# Patient Record
Sex: FEMALE | Race: WHITE | NOT HISPANIC OR LATINO | Employment: FULL TIME | URBAN - METROPOLITAN AREA
[De-identification: names, ages, dates, MRNs, and addresses within clinical notes are randomized per-mention and may not be internally consistent; named-entity substitution may affect disease eponyms.]

---

## 2024-11-10 ENCOUNTER — HOSPITAL ENCOUNTER (OUTPATIENT)
Dept: RADIOLOGY | Facility: MEDICAL CENTER | Age: 49
End: 2024-11-10
Attending: PHYSICIAN ASSISTANT
Payer: COMMERCIAL

## 2024-11-10 ENCOUNTER — OCCUPATIONAL MEDICINE (OUTPATIENT)
Dept: URGENT CARE | Facility: PHYSICIAN GROUP | Age: 49
End: 2024-11-10
Payer: COMMERCIAL

## 2024-11-10 VITALS
BODY MASS INDEX: 36.05 KG/M2 | SYSTOLIC BLOOD PRESSURE: 126 MMHG | HEIGHT: 62 IN | OXYGEN SATURATION: 99 % | RESPIRATION RATE: 16 BRPM | WEIGHT: 195.9 LBS | DIASTOLIC BLOOD PRESSURE: 70 MMHG | HEART RATE: 99 BPM | TEMPERATURE: 98.9 F

## 2024-11-10 DIAGNOSIS — M53.3 SACROILIAC JOINT DYSFUNCTION OF LEFT SIDE: ICD-10-CM

## 2024-11-10 DIAGNOSIS — S73.102A HIP SPRAIN, LEFT, INITIAL ENCOUNTER: ICD-10-CM
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
## 2024-11-10 DIAGNOSIS — S39.012A STRAIN OF LUMBAR REGION, INITIAL ENCOUNTER: ICD-10-CM

## 2024-11-10 PROCEDURE — 3078F DIAST BP <80 MM HG: CPT | Performed by: PHYSICIAN ASSISTANT

## 2024-11-10 PROCEDURE — 99204 OFFICE O/P NEW MOD 45 MIN: CPT | Performed by: PHYSICIAN ASSISTANT

## 2024-11-10 PROCEDURE — 3074F SYST BP LT 130 MM HG: CPT | Performed by: PHYSICIAN ASSISTANT

## 2024-11-10 PROCEDURE — 72100 X-RAY EXAM L-S SPINE 2/3 VWS: CPT

## 2024-11-10 PROCEDURE — 73501 X-RAY EXAM HIP UNI 1 VIEW: CPT | Mod: LT

## 2024-11-10 RX ORDER — METHYLPREDNISOLONE 4 MG/1
4 TABLET ORAL DAILY
Qty: 21 TABLET | Refills: 0 | Status: SHIPPED | OUTPATIENT
Start: 2024-11-10 | End: 2024-11-20

## 2024-11-10 NOTE — PROGRESS NOTES
"Subjective:     Viviana Capps is a 49 y.o. female who presents for Other (New worker comps. Happened Friday, putting boxes away at work, now back whole back is in pain. )         DOI: 11/8/2024    SARBJIT: Injured left hip and left low back while moving filing boxes into storage unit    Initial visit:  Presents today for the above-stated injury on the above-stated date.  She describes the pain as being present over the left lumbosacral region, left buttock and extending into the left hip.  Does have occasional left anterior hip crease pain.  She does have an ability to put on pants, shoes and socks due to the discomfort.  No numbness/tingling into the groin, no loss of bowel or bladder function.  No secondary occupation, no predisposing injuries.  Used over-the-counter medications for pain.    PMH:   No pertinent past medical history to this problem  MEDS:  Medications were reviewed in EMR  ALLERGIES:  Allergies were reviewed in EMR  SOCHX:  Works as a subsidy   FH:   No pertinent family history to this problem       Objective:     /70 (BP Location: Right arm, Patient Position: Sitting, BP Cuff Size: Adult)   Pulse 99   Temp 37.2 °C (98.9 °F)   Resp 16   Ht 1.575 m (5' 2\")   Wt 88.9 kg (195 lb 14.4 oz)   SpO2 99%   BMI 35.83 kg/m²     Examination of the lumbosacral region does reveal tenderness palpation over the left SI joint, left lumbar paraspinal musculature and left gluteal muscles.  Patient in a fixed upright trunk position due to pain    Examination of the left hip does reveal pain with active hip flexion, normal range of motion of both hip internal and external rotation but does have pain with internal rotation.      Diagnostic:    Lumbar x-ray series  Radiologist IMPRESSION:  Facet arthropathy without malalignment or fracture    Left hip x-ray series  Radiologist IMPRESSION:  1.  Normal frontal view of the pelvis and left hip series  2.  Facet arthropathy of the lumbar " spine    Assessment/Plan:     Encounter Diagnoses   Name Primary?    Strain of lumbar region, initial encounter     Sacroiliac joint dysfunction of left side     Hip sprain, left, initial encounter     Work related injury          Plan:  X-ray of the lumbar spine and left hip, lumbar x-ray revealed multilevel facet joint arthropathy.  Trial Medrol Dosepak, Zanaflex, use over-the-counter anti-inflammatory medications.  Avoid muscle relaxer use prior to or during work or while operating motor vehicle.  Work status updated.  Return on 11/15/2024    Differential diagnosis, natural history, supportive care, and indications for immediate follow-up discussed.    Bogdan Lujan PA-C

## 2024-11-10 NOTE — LETTER
"    EMPLOYEE’S CLAIM FOR COMPENSATION/ REPORT OF INITIAL TREATMENT  FORM C-4  PLEASE TYPE OR PRINT    EMPLOYEE’S CLAIM - PROVIDE ALL INFORMATION REQUESTED   First Name                    JUAN JOSE Michelle                  Last Name  Génesis Birthdate                    1975                Sex  Female Claim Number (Insurer’s Use Only)     Home Address  [address] Age  49 y.o. Height  1.575 m (5' 2\") Weight  88.9 kg (195 lb 14.4 oz) Social Security Number     [place]  [address] Telephone  [phone] (home)    Mailing Address  [address] [place]  [address] Primary Language Spoken  English    INSURER   THIRD-PARTY   Misc Workers Comp   Employee's Occupation (Job Title) When Injury or Occupational Disease Occurred  [employer]     Employer's Name/Company Name     Telephone      Office Mail Address (Number and Street)       Date of Injury (if applicable) 11/8/2024               Hours Injury (if applicable)  10:00 AM Date Employer Notified  11/8/2024 Last Day of Work after Injury or Occupational Disease  11/8/2024 Supervisor to Whom Injury Reported  Briana Warner   Address or Location of Accident (if applicable)  Work [1]   What were you doing at the time of accident? (if applicable)  Moving boxes from truck into the storage unit    How did this injury or occupational disease occur? (Be specific and answer in detail. Use additional sheet if necessary)  was moving boxes of files into the storage unit. When trying to put the box on the shelf I turned wrong and felt pain in my left hip   If you believe that you have an occupational disease, when did you first have knowledge of the disability and its relationship to your employment?  N/A Witnesses to the Accident (if applicable)  co-workers Roz Alvarez and Tisha Rose      Nature of " Injury or Occupational Disease  Strain  Part(s) of Body Injured or Affected  Hip (L) Upper Leg (L) Lower Back Area (Lumbar Area & Lumbo-Sacral)    I CERTIFY THAT THE ABOVE IS TRUE AND CORRECT TO T HE BEST OF MY KNOWLEDGE AND THAT I HAVE PROVIDED THIS INFORMATION IN ORDER TO OBTAIN THE BENEFITS OF [place]CC[place], OR CHAPTER [place]).  I HEREBY AUTHORIZE ANY PHYSICIAN, CHIROPRACTOR, SURGEON, PRACTITIONER OR ANY OTHER PERSON, ANY HOSPITAL, INCLUDING [facility] OR [facility], ANY  MEDICAL SERVICE ORGANIZATION, ANY INSURANCE COMPANY, OR OTHER INSTITUTION OR ORGANIZATION TO RELEASE TO EACH OTHER, ANY MEDICAL OR OTHER INFORMATION, INCLUDING BENEFITS PAID OR PAYABLE, PERTINENT TO THIS INJURY OR DISEASE, EXCEPT INFORMATION RELATIVE TO DIAGNOSIS, TREATMENT AND/OR COUNSELING FOR AIDS, PSYCHOLOGICAL CONDITIONS, ALCOHOL OR CONTROLLED SUBSTANCES, FOR WHICH I MUST GIVE SPECIFIC AUTHORIZATION.  A PHOTOSTAT OF THIS AUTHORIZATION SHALL BE VALID AS THE ORIGINAL.     Date   Place Employee’s Original or  *Electronic Signature   THIS REPORT MUST BE COMPLETED AND MAILED WITHIN 3 WORKING DAYS OF TREATMENT   Place  [facility]    Name of Facility  [place]   Date 11/10/2024 Diagnosis and Description of Injury or Occupational Disease  (S39.012A) Strain of lumbar region, initial encounter  (M53.3) Sacroiliac joint dysfunction of left side  (S73.102A) Hip sprain, left, initial encounter  (Y99.0) Work related injury  Diagnoses of Strain of lumbar region, initial encounter, Sacroiliac joint dysfunction of left side, Hip sprain, left, initial encounter, and Work related injury were pertinent to this visit. Is there evidence that the injured employee was under the influence of alcohol and/or another controlled substance at the time of accident?  []No  [] Yes (if yes, please explain)   Hour 2:06 PM  No   Treatment: X-ray of the lumbar  spine and left hip, lumbar x-ray revealed multilevel facet joint arthropathy.  Trial Medrol Dosepak, Zanaflex, use over-the-counter anti-inflammatory medications.  Avoid muscle relaxer use prior to or during work or while operating motor vehicle.  Work status updated.  Return on 11/15/2024      Have you advised the patient to remain off work five days or more?   [] Yes Indicate dates: From   To    [] No      If no, is the injured employee capable of: [] full duty [] modified duty                     If modified duty, specify any limitations / restrictions:  See D39                                                                                                                                                                                                                                                                                                                                                                                                               X-Ray Findings: Negative    From information given by the employee, together with medical evidence, can you directly connect this injury or occupational disease as job incurred?  []Yes   [] No Yes    Is additional medical care by a physician indicated? []Yes [] No  Yes    Do you know of any previous injury or disease contributing to this condition or occupational disease? []Yes [] No (Explain if yes)                          No   Date  11/10/2024 Print Health Care Provider’s Name  Edmundo Lujan P.A.-C. I certify that the employer’s copy of  this form was delivered to the employer on:   Address  [address] INSURER'S USE ONLY                       [place]  [address] Provider’s Tax ID Number  [id number]   Telephone  Dept: [phone]    Health Care Provider’s Original or Electronic Signature  e-EDMUNDO Prieto P.A.-C. Degree (MD,DO, DC,PALuizC,APRN)  PRITESH  Choose (if applicable)      ORIGINAL - TREATING HEALTHCARE PROVIDER PAGE 2 - INSURER/TPA  PAGE 3 - EMPLOYER PAGE 4 - EMPLOYEE             Form C-4 (rev.08/23)

## 2024-11-10 NOTE — LETTER
PHYSICIAN’S AND CHIROPRACTIC PHYSICIAN'S   PROGRESS REPORT   CERTIFICATION OF DISABILITY Claim Number:     Social Security Number:    Patient’s Name: Viviana Capps Date of Injury: 11/8/2024   Employer:  [employer] Name of MCO (if applicable):      Patient’s Job Description/Occupation: [employer]       Previous Injuries/Diseases/Surgeries Contributing to the Condition:         Diagnosis: (S39.012A) Strain of lumbar region, initial encounter  (M53.3) Sacroiliac joint dysfunction of left side  (S73.102A) Hip sprain, left, initial encounter  (Y99.0) Work related injury      Related to the Industrial Injury? Yes     Explain: DOI: 11/8/2024    SARBJIT: Injured left hip and left low back while moving filing boxes into storage unit    Initial visit:  Presents today for the above-stated injury on the above-stated date.  She describes the pain as being present over the left lumbosacral region, left buttock and extending into the left hip.  Does have occasional left anterior hip crease pain.  She does have an ability to put on pants, shoes and socks due to the discomfort.  No numbness/tingling into the groin, no loss of bowel or bladder function.  No secondary occupation, no predisposing injuries.  Used over-the-counter medications for pain.      Objective Medical Findings: Examination of the lumbosacral region does reveal tenderness palpation over the left SI joint, left lumbar paraspinal musculature and left gluteal muscles.  Patient in a fixed upright trunk position due to pain    Examination of the left hip does reveal pain with active hip flexion, normal range of motion of both hip internal and external rotation but does have pain with internal rotation.         None - Discharged                         Stable  No                 Ratable  No        Generally Improved                         Condition Worsened                  Condition Same  May Have Suffered a Permanent  Disability No     Treatment Plan:    X-ray of the lumbar spine and left hip, lumbar x-ray revealed multilevel facet joint arthropathy.  Trial Medrol Dosepak, Zanaflex, use over-the-counter anti-inflammatory medications.  Avoid muscle relaxer use prior to or during work or while operating motor vehicle.  Work status updated.  Return on 11/15/2024         No Change in Therapy                  PT/OT Prescribed                      Medication May be Used While Working        Case Management                          PT/OT Discontinued    Consultation    Further Diagnostic Studies:    Prescription(s)           Medrol Dosepak, Zanaflex     Released to FULL DUTY /No Restrictions on (Date):       Certified TOTALLY TEMPORARILY DISABLED (Indicate Dates) From:   To:    X  Released to RESTRICTED/Modified Duty on (Date): From: 11/10/2024 To: 11/15/2024  Restrictions Are:  Temporary      No Sitting    No Standing    No Pulling Other: Sedentary desk work only, no bending, lifting or twisting       No Bending at Waist     No Stooping     No Lifting        No Carrying     No Walking Lifting Restricted to (lbs.):          No Pushing        No Climbing     No Reaching Above Shoulders       Date of Next Visit:  11/15/2024 Date of this Exam: 11/10/2024 Physician/Chiropractic Physician Name: Bogdan Lujan P.A.-C. Physician/Chiropractic Physician Signature:  Brice Carvajal DO [facility]:  [address] [place] [address], [address] [place], [place] [address] - Telephone [phone] [place]:  [address], [address] [place], [place] [address] - Telephone [phone]    [email or web address]  D-39 (Rev. 10/24)

## 2024-11-15 ENCOUNTER — OCCUPATIONAL MEDICINE (OUTPATIENT)
Dept: URGENT CARE | Facility: PHYSICIAN GROUP | Age: 49
End: 2024-11-15
Payer: COMMERCIAL

## 2024-11-15 VITALS
OXYGEN SATURATION: 98 % | WEIGHT: 178.57 LBS | SYSTOLIC BLOOD PRESSURE: 130 MMHG | TEMPERATURE: 98.1 F | DIASTOLIC BLOOD PRESSURE: 78 MMHG | RESPIRATION RATE: 14 BRPM | BODY MASS INDEX: 32.86 KG/M2 | HEIGHT: 62 IN | HEART RATE: 83 BPM

## 2024-11-15 DIAGNOSIS — M53.3 SACROILIAC PAIN: ICD-10-CM

## 2024-11-15 PROCEDURE — 3078F DIAST BP <80 MM HG: CPT | Performed by: FAMILY MEDICINE

## 2024-11-15 PROCEDURE — 3075F SYST BP GE 130 - 139MM HG: CPT | Performed by: FAMILY MEDICINE

## 2024-11-15 PROCEDURE — 99213 OFFICE O/P EST LOW 20 MIN: CPT | Performed by: FAMILY MEDICINE

## 2024-11-15 ASSESSMENT — ENCOUNTER SYMPTOMS: CONSTITUTIONAL NEGATIVE: 1

## 2024-11-15 NOTE — LETTER
PHYSICIAN’S AND CHIROPRACTIC PHYSICIAN'S   PROGRESS REPORT   CERTIFICATION OF DISABILITY Claim Number:     Social Security Number:    Patient’s Name: Viviana Capps Date of Injury: 11/8/2024   Employer: [employer] Name of MCO (if applicable):      Patient’s Job Description/Occupation: [employer]        Previous Injuries/Diseases/Surgeries Contributing to the Condition:         Diagnosis: (M53.3) Sacroiliac pain      Related to the Industrial Injury? Yes     Explain: Hurt hip/back lifting      Objective Medical Findings: Sacroiliac pain         None - Discharged                         Stable  Yes                 Ratable  No     X   Generally Improved                         Condition Worsened                  Condition Same  May Have Suffered a Permanent Disability No     Treatment Plan:    Continue restrictions, ibuprofen, muscle relaxant      X   No Change in Therapy                  PT/OT Prescribed                      Medication May be Used While Working        Case Management                          PT/OT Discontinued    Consultation    Further Diagnostic Studies:    Prescription(s) Ref for chiropractic manipulation               Released to FULL DUTY /No Restrictions on (Date):       Certified TOTALLY TEMPORARILY DISABLED (Indicate Dates) From:   To:    X  Released to RESTRICTED/Modified Duty on (Date): From: 11/15/2024 To: 11/22/2024  Restrictions Are:         No Sitting    No Standing    No Pulling Other: May stand every 20 min   X    No Bending at Waist X    No Stooping     No Lifting        No Carrying     No Walking Lifting Restricted to (lbs.):  < or = to 10 pounds    X   No Pushing     X   No Climbing  X   No Reaching Above Shoulders       Date of Next Visit:  11/20/2024 Date of this Exam: 11/15/2024 Physician/Chiropractic Physician Name: Antolin Melgar M.D. Physician/Chiropractic Physician Signature:  Brice Carvajal DO MPH     [place]:  [address] [place] [address]  [place], [address] [place], [place] [address] - Telephone [phone] [place]:  [address], [address] [place], [place] [address] - Telephone [phone]    [email or web address]  D-39 (Rev. 10/24)

## 2024-11-16 NOTE — PROGRESS NOTES
"Subjective:   Viviana Capps is a 49 y.o. female who presents for No chief complaint on file.      HPI    Review of Systems   Constitutional: Negative.    Musculoskeletal:         Sacroiliac pain       Medications, Allergies, and current problem list reviewed today in Epic.     Objective:     /78 (BP Location: Right arm, Patient Position: Sitting, BP Cuff Size: Adult)   Pulse 83   Temp 36.7 °C (98.1 °F) (Temporal)   Resp 14   Ht 1.575 m (5' 2\")   Wt 81 kg (178 lb 9.2 oz)   SpO2 98%     Physical Exam  Vitals and nursing note reviewed.   Constitutional:       Appearance: Normal appearance.   Cardiovascular:      Rate and Rhythm: Normal rate and regular rhythm.      Pulses: Normal pulses.      Heart sounds: Normal heart sounds.   Pulmonary:      Effort: Pulmonary effort is normal.      Breath sounds: Normal breath sounds.   Musculoskeletal:      Comments: Left sacroiliac pain,    Neurological:      Mental Status: She is alert.         Assessment/Plan:     Diagnosis and associated orders:     1. Sacroiliac pain  Referral to Chiropractic         Comments/MDM:     Continue on current medication         Differential diagnosis, natural history, supportive care, and indications for immediate follow-up discussed.    Advised the patient to follow-up with the primary care physician for recheck, reevaluation, and consideration of further management.    Please note that this dictation was created using voice recognition software. I have made a reasonable attempt to correct obvious errors, but I expect that there are errors of grammar and possibly content that I did not discover before finalizing the note.    This note was electronically signed by Antolin Melgar M.D.  "

## 2024-11-20 ENCOUNTER — OCCUPATIONAL MEDICINE (OUTPATIENT)
Dept: URGENT CARE | Facility: PHYSICIAN GROUP | Age: 49
End: 2024-11-20
Payer: COMMERCIAL

## 2024-11-20 VITALS
DIASTOLIC BLOOD PRESSURE: 60 MMHG | HEIGHT: 62 IN | OXYGEN SATURATION: 99 % | WEIGHT: 178 LBS | BODY MASS INDEX: 32.76 KG/M2 | HEART RATE: 84 BPM | SYSTOLIC BLOOD PRESSURE: 122 MMHG | TEMPERATURE: 97.9 F | RESPIRATION RATE: 18 BRPM

## 2024-11-20 DIAGNOSIS — M53.3 SACROILIAC PAIN: ICD-10-CM

## 2024-11-20 PROCEDURE — 3078F DIAST BP <80 MM HG: CPT

## 2024-11-20 PROCEDURE — 3074F SYST BP LT 130 MM HG: CPT

## 2024-11-20 PROCEDURE — 99214 OFFICE O/P EST MOD 30 MIN: CPT

## 2024-11-20 NOTE — LETTER
"PHYSICIAN’S AND CHIROPRACTIC PHYSICIAN'S   PROGRESS REPORT   CERTIFICATION OF DISABILITY Claim Number:     Social Security Number:    Patient’s Name: Viviana Capps Date of Injury: 11/8/2024   Employer: [employer] Name of MCO (if applicable):      Patient’s Job Description/Occupation: [employer]       Previous Injuries/Diseases/Surgeries Contributing to the Condition:  None      Diagnosis: (M53.3) Sacroiliac pain      Related to the Industrial Injury? Yes     Explain: Copied \"DOI: 11/8/2024    SARBJIT: Injured left hip and left low back while moving filing boxes into storage unit     Initial visit:  Presents today for the above-stated injury on the above-stated date.  She describes the pain as being present over the left lumbosacral region, left buttock and extending into the left hip.  Does have occasional left anterior hip crease pain.  She does have an ability to put on pants, shoes and socks due to the discomfort.  No numbness/tingling into the groin, no loss of bowel or bladder function.  No secondary occupation, no predisposing injuries.  Used over-the-counter medications for pain.\"    3rd visit: Patient still reports tightness to Left hip with radiating pain to Left thigh. Previous visit Patient was provided referral to Chiropractor which is still pending. Patient taking muscle relaxer and ibuprofen as needed. Patient also applying heat/ice to area. Patient denies any new/other changes.         Objective Medical Findings: Physical Exam  Vitals and nursing note reviewed.   Constitutional:       General: She is not in acute distress.     Appearance: Normal appearance. She is not ill-appearing.   HENT:      Head: Normocephalic and atraumatic.      Right Ear: Tympanic membrane normal.      Left Ear: Tympanic membrane normal.      Nose: Nose normal.      Mouth/Throat:      Mouth: Mucous membranes are moist.   Eyes:      Conjunctiva/sclera: Conjunctivae normal. "   Cardiovascular:      Rate and Rhythm: Normal rate.      Heart sounds: Normal heart sounds.   Pulmonary:      Effort: Pulmonary effort is normal.   Abdominal:      General: Abdomen is flat.      Palpations: Abdomen is soft.   Musculoskeletal:      Cervical back: Normal range of motion.      Left hip: Tenderness present. No deformity or bony tenderness. Decreased range of motion. Normal strength.   Skin:     General: Skin is warm and dry.      Capillary Refill: Capillary refill takes less than 2 seconds.   Neurological:      Mental Status: She is alert and oriented to person, place, and time.   Psychiatric:         Mood and Affect: Mood normal.         Behavior: Behavior normal.              None - Discharged                         Stable  No                 Ratable  No        Generally Improved                         Condition Worsened               X   Condition Same  May Have Suffered a Permanent Disability No     Treatment Plan:    Continue use of Zanaflex and Ibuprofen. Patient still awaiting chiropractor referral. Continue heat/ice therapy as needed.          No Change in Therapy                  PT/OT Prescribed                      Medication May be Used While Working        Case Management                          PT/OT Discontinued    Consultation    Further Diagnostic Studies:    Prescription(s)                 Released to FULL DUTY /No Restrictions on (Date):       Certified TOTALLY TEMPORARILY DISABLED (Indicate Dates) From:   To:    X  Released to RESTRICTED/Modified Duty on (Date): From: 11/20/2024 To: 12/3/2024  Restrictions Are:  Temporary      No Sitting    No Standing X   No Pulling Other:     X    No Bending at Waist X    No Stooping X    No Lifting    X    No Carrying     No Walking Lifting Restricted to (lbs.):       X   No Pushing        No Climbing     No Reaching Above Shoulders       Date of Next Visit:  12/3/2024 Date of this Exam: 11/20/2024 Physician/Chiropractic Physician Name: Chip  MARION Alejo Physician/Chiropractic Physician Signature:  Brice Carvajal DO MPH     [place]:  [facility], [address] [place], [place] [address] - Telephone [phone] [place]:  [address] [place] [address], [address] [place], [place] [address] - Telephone [phone]    [email or web address]  D-39 (Rev. 10/24)

## 2024-11-21 NOTE — PROGRESS NOTES
"Subjective:     Viviana Capps is a 49 y.o. female who presents for Follow-Up (Workers Comp [employer] hip)    Copied \"DOI: 11/8/2024    SARBJIT: Injured left hip and left low back while moving filing boxes into storage unit     Initial visit:  Presents today for the above-stated injury on the above-stated date.  She describes the pain as being present over the left lumbosacral region, left buttock and extending into the left hip.  Does have occasional left anterior hip crease pain.  She does have an ability to put on pants, shoes and socks due to the discomfort.  No numbness/tingling into the groin, no loss of bowel or bladder function.  No secondary occupation, no predisposing injuries.  Used over-the-counter medications for pain.\"    3rd visit: Patient still reports tightness to Left hip with radiating pain to Left thigh. Previous visit Patient was provided referral to Chiropractor which is still pending. Patient taking muscle relaxer and ibuprofen as needed. Patient also applying heat/ice to area. Patient denies any new/other changes.        PMH:   No pertinent past medical history to this problem  MEDS:  Medications were reviewed in EMR  ALLERGIES:  Allergies were reviewed in EMR  SOCHX:  Works as a   FH:   No pertinent family history to this problem       Objective:     /60   Pulse 84   Temp 36.6 °C (97.9 °F)   Resp 18   Ht 1.575 m (5' 2\")   Wt 80.7 kg (178 lb)   SpO2 99%   BMI 32.56 kg/m²     Physical Exam  Vitals and nursing note reviewed.   Constitutional:       General: She is not in acute distress.     Appearance: Normal appearance. She is not ill-appearing.   HENT:      Head: Normocephalic and atraumatic.      Right Ear: Tympanic membrane normal.      Left Ear: Tympanic membrane normal.      Nose: Nose normal.      Mouth/Throat:      Mouth: Mucous membranes are moist.   Eyes:      Conjunctiva/sclera: Conjunctivae normal.   Cardiovascular:      Rate and Rhythm: Normal rate.      " Heart sounds: Normal heart sounds.   Pulmonary:      Effort: Pulmonary effort is normal.   Abdominal:      General: Abdomen is flat.      Palpations: Abdomen is soft.   Musculoskeletal:      Cervical back: Normal range of motion.      Left hip: Tenderness present. No deformity or bony tenderness. Decreased range of motion. Normal strength.   Skin:     General: Skin is warm and dry.      Capillary Refill: Capillary refill takes less than 2 seconds.   Neurological:      Mental Status: She is alert and oriented to person, place, and time.   Psychiatric:         Mood and Affect: Mood normal.         Behavior: Behavior normal.         Assessment/Plan:       1. Sacroiliac pain    Patient to continue current restrictions.  See D39 for details.  Patient to continue use of as needed tizanidine and ibuprofen.  Patient to continue use of heat and ice therapy as needed for symptoms.  Patient referral to chiropractor still pending and does appear that it was sent in on the 19th of this month.  Patient to follow-up for reevaluation      Differential diagnosis, natural history, supportive care, and indications for immediate follow-up discussed.

## 2024-12-03 ENCOUNTER — OCCUPATIONAL MEDICINE (OUTPATIENT)
Dept: URGENT CARE | Facility: PHYSICIAN GROUP | Age: 49
End: 2024-12-03
Payer: COMMERCIAL

## 2024-12-03 VITALS
HEIGHT: 62 IN | BODY MASS INDEX: 32.76 KG/M2 | SYSTOLIC BLOOD PRESSURE: 130 MMHG | WEIGHT: 178 LBS | RESPIRATION RATE: 14 BRPM | DIASTOLIC BLOOD PRESSURE: 72 MMHG | TEMPERATURE: 97.6 F | HEART RATE: 88 BPM | OXYGEN SATURATION: 98 %

## 2024-12-03 DIAGNOSIS — S39.012D STRAIN OF LUMBAR REGION, SUBSEQUENT ENCOUNTER: ICD-10-CM

## 2024-12-03 DIAGNOSIS — M53.3 SACROILIAC PAIN: ICD-10-CM

## 2024-12-03 PROCEDURE — 99214 OFFICE O/P EST MOD 30 MIN: CPT | Performed by: STUDENT IN AN ORGANIZED HEALTH CARE EDUCATION/TRAINING PROGRAM

## 2024-12-03 PROCEDURE — 3075F SYST BP GE 130 - 139MM HG: CPT | Performed by: STUDENT IN AN ORGANIZED HEALTH CARE EDUCATION/TRAINING PROGRAM

## 2024-12-03 PROCEDURE — 3078F DIAST BP <80 MM HG: CPT | Performed by: STUDENT IN AN ORGANIZED HEALTH CARE EDUCATION/TRAINING PROGRAM

## 2024-12-03 NOTE — LETTER
PHYSICIAN’S AND CHIROPRACTIC PHYSICIAN'S   PROGRESS REPORT   CERTIFICATION OF DISABILITY Claim Number:     Social Security Number:    Patient’s Name: Viviana Capps Date of Injury: 11/8/2024   Employer: [employer] Name of MCO (if applicable):      Patient’s Job Description/Occupation: [employer]       Previous Injuries/Diseases/Surgeries Contributing to the Condition:         Diagnosis: (M53.3) Sacroiliac pain  (S39.012D) Strain of lumbar region, subsequent encounter      Related to the Industrial Injury? Yes     Explain:        Objective Medical Findings: Lumbar back: Tenderness present. No swelling, deformity or bony tenderness. Decreased range of motion.   Left hip: No tenderness or bony tenderness. Normal range of motion.          None - Discharged                         Stable  No                 Ratable  No     X   Generally Improved                         Condition Worsened               X   Condition Same  May Have Suffered a Permanent Disability No     Treatment Plan:    OTC ibuprofen, rest, ice/heat, gentle ROM/stretching.         No Change in Therapy                  PT/OT Prescribed                      Medication May be Used While Working        Case Management                          PT/OT Discontinued    Consultation    Further Diagnostic Studies:    Prescription(s)                 Released to FULL DUTY /No Restrictions on (Date):       Certified TOTALLY TEMPORARILY DISABLED (Indicate Dates) From:   To:    X  Released to RESTRICTED/Modified Duty on (Date): From: 12/3/2024 To: 12/11/2024  Restrictions Are:         No Sitting    No Standing    No Pulling Other:         No Bending at Waist     No Stooping     No Lifting        No Carrying     No Walking Lifting Restricted to (lbs.):  < or = to 10 pounds       No Pushing        No Climbing     No Reaching Above Shoulders       Date of Next Visit:  12/11/2024  *schedule appt. with [facility] med. Date of this Exam:  12/3/2024 Physician/Chiropractic Physician Name: Latasha Garay P.A.-C. Physician/Chiropractic Physician Signature:  Brice Carvajal DO [facility]:  [facility] access hospital[facility]  [facility], [address] [place], [place] [address] - Telephone [phone] [place]:  [address] [place] [address], [address] [place], [place] [address] - Telephone [phone]    [email or web address]  D-39 (Rev. 10/24)

## 2024-12-04 ASSESSMENT — ENCOUNTER SYMPTOMS
CHILLS: 0
WEAKNESS: 0
TINGLING: 0
FEVER: 0
SENSORY CHANGE: 0
BACK PAIN: 1

## 2024-12-04 NOTE — PROGRESS NOTES
"Subjective     Viviana Capps is a 49 y.o. female who presents with Follow-Up ([employer] [facility] hip)            HPI    VISIT #4 (12/3/24): Patient reports 60% improvement. Patient has still waiting on chiropractor referral.  Patient is frustrated and she does not understand why chiropractor has not been approved yet.  Patient still having pain and discomfort with certain movements.  Still taking muscle relaxer and ibuprofen as needed.    Review of Systems   Constitutional:  Negative for chills and fever.   Musculoskeletal:  Positive for back pain.   Neurological:  Negative for tingling, sensory change and weakness.   All other systems reviewed and are negative.             Objective     /72   Pulse 88   Temp 36.4 °C (97.6 °F)   Resp 14   Ht 1.575 m (5' 2\")   Wt 80.7 kg (178 lb)   SpO2 98%   BMI 32.56 kg/m²      Physical Exam  Vitals reviewed.   Constitutional:       Appearance: Normal appearance.   HENT:      Head: Normocephalic and atraumatic.      Nose: Nose normal.   Eyes:      Extraocular Movements: Extraocular movements intact.      Conjunctiva/sclera: Conjunctivae normal.      Pupils: Pupils are equal, round, and reactive to light.   Cardiovascular:      Rate and Rhythm: Normal rate.   Pulmonary:      Effort: Pulmonary effort is normal.   Musculoskeletal:      Lumbar back: Tenderness present. No swelling, deformity or bony tenderness. Decreased range of motion.        Back:       Left hip: No tenderness or bony tenderness. Normal range of motion.   Skin:     General: Skin is warm and dry.   Neurological:      General: No focal deficit present.      Mental Status: She is alert and oriented to person, place, and time.      Gait: Gait is intact.         Lumbar back: Tenderness present. No swelling, deformity or bony tenderness. Decreased range of motion.   Left hip: No tenderness or bony tenderness. Normal range of motion.                    Assessment & Plan        Assessment & Plan  Strain of " lumbar region, subsequent encounter         Sacroiliac pain    Orders:    Referral to Occupational Medicine         I personally reviewed prior external notes and test results pertinent to today's visit, including prior work comp visits on 11/10/2024 11/15/2024 and 11/20/2024.  Today is patient's fourth visit.    D39 completed.  WORK RESTRICTIONS: Lifting limit of less than or equal to 10 pounds.    Differential diagnoses, supportive care measures (rest, heat, gentle ROM/stretching, OTC Tylenol/ibuprofen) and indications for immediate follow-up discussed with patient. Pathogenesis of diagnosis discussed including typical length and natural progression.      Instructed to return to urgent care or nearest emergency department if symptoms fail to improve, for any change in condition, further concerns, or new concerning symptoms.    Patient states understanding and agrees with the plan of care and discharge instructions.       My total time spent caring for the patient on the day of the encounter was 30 minutes including obtaining patient history, physical exam, discussing differential diagnosis, plan of care, supportive care, appropriate follow-up and indications for immediate follow-up. This does not include time spent on separately billable procedures/tests.

## 2024-12-11 ENCOUNTER — OCCUPATIONAL MEDICINE (OUTPATIENT)
Dept: OCCUPATIONAL MEDICINE | Facility: CLINIC | Age: 49
End: 2024-12-11
Payer: COMMERCIAL

## 2024-12-11 VITALS
SYSTOLIC BLOOD PRESSURE: 128 MMHG | TEMPERATURE: 98.8 F | BODY MASS INDEX: 36.44 KG/M2 | RESPIRATION RATE: 14 BRPM | DIASTOLIC BLOOD PRESSURE: 84 MMHG | OXYGEN SATURATION: 98 % | HEIGHT: 62 IN | HEART RATE: 98 BPM | WEIGHT: 198 LBS

## 2024-12-11 DIAGNOSIS — S39.012D STRAIN OF LUMBAR REGION, SUBSEQUENT ENCOUNTER: ICD-10-CM

## 2024-12-11 PROCEDURE — 99213 OFFICE O/P EST LOW 20 MIN: CPT | Performed by: PREVENTIVE MEDICINE

## 2024-12-11 RX ORDER — ETODOLAC 400 MG/1
400 TABLET, FILM COATED ORAL 2 TIMES DAILY
Qty: 60 TABLET | Refills: 0 | Status: SHIPPED | OUTPATIENT
Start: 2024-12-11

## 2024-12-11 RX ORDER — CYCLOBENZAPRINE HCL 10 MG
10 TABLET ORAL
Qty: 30 TABLET | Refills: 0 | Status: SHIPPED | OUTPATIENT
Start: 2024-12-11

## 2024-12-11 ASSESSMENT — PAIN SCALES - GENERAL: PAINLEVEL_OUTOF10: 6=MODERATE PAIN

## 2024-12-11 NOTE — LETTER
PHYSICIAN’S AND CHIROPRACTIC PHYSICIAN'S   PROGRESS REPORT   CERTIFICATION OF DISABILITY Claim Number:     Social Security Number:    Patient’s Name: Viviana Capps Date of Injury: 11/8/2024   Employer: [employer] Name of MCO (if applicable):      Patient’s Job Description/Occupation: [employer]       Previous Injuries/Diseases/Surgeries Contributing to the Condition:         Diagnosis: (S39.012D) Strain of lumbar region, subsequent encounter      Related to the Industrial Injury? Yes     Explain:        Objective Medical Findings: Lumbar: No gross deformity.  Tenderness from the left lower lumbar paraspinal musculature through SI joint and lateral hip.  Range of motion diminished mildly with flexion.  Straight leg test positive on left.  Lower extremity reflexes intact.  Lower extremity strength intact.         None - Discharged                         Stable  No                 Ratable  No        Generally Improved                      X   Condition Worsened                  Condition Same  May Have Suffered a Permanent Disability No     Treatment Plan:    Given duration of symptoms and continued radicular symptoms refer for MRI lumbar spine without  Referral to chiropractic care, pending approval  Placed referral for physical therapy as well  Prescribed Lodine for 100 mg twice daily and cyclobenzaprine 10 mg to use at night         No Change in Therapy                  PT/OT Prescribed                      Medication May be Used While Working        Case Management                          PT/OT Discontinued    Consultation    Further Diagnostic Studies:    Prescription(s)                 Released to FULL DUTY /No Restrictions on (Date):       Certified TOTALLY TEMPORARILY DISABLED (Indicate Dates) From:   To:    X  Released to RESTRICTED/Modified Duty on (Date): From: 12/11/2024 To: 1/8/2025  Restrictions Are:  Temporary      No Sitting    No Standing    No Pulling  Other:         No Bending at Waist     No Stooping     No Lifting        No Carrying     No Walking Lifting Restricted to (lbs.):  < or = to 10 pounds       No Pushing        No Climbing     No Reaching Above Shoulders       Date of Next Visit:  1/8/2025  @4 pm  Date of this Exam: 12/11/2024 Physician/Chiropractic Physician Name: Brice Carvajal D.O. Physician/Chiropractic Physician Signature:  Brice Carvajal DO MPH     [place]:  [address] [place] [address], [address] [place], [place] [address] - Telephone [phone] [place]:  [address] [place] [address], [address] [place], [place] [address] - Telephone [phone]    [email or web address]  D-39 (Rev. 10/24)

## 2024-12-11 NOTE — PROGRESS NOTES
"Subjective:     Viviana Capps is a 49 y.o. female who presents for Follow-Up (DOI: 11/8/2024 - left hip and left low back - )    SARBJIT: Was moving boxes and felt sudden pain in the low back.  Seen in urgent care initially prescribed tizanidine and advised OTC NSAIDs.  Referral to chiropractic care and occupational health was placed    12/11/2024: Patient states that initially symptoms have been improving but the last week symptoms have suddenly worsened.  Has left-sided low back pain with radiation to the left leg.  Only gets occasional numbness and tingling.  Denies any weakness in the lower extremities.  She states that ibuprofen and tizanidine do not seem to be helping much.  She has not heard from the work comp insurance at all with the exception of receiving a medication card.  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      ROS: All systems were reviewed on intake form, form was reviewed and signed. See scanned documents in media. Pertinent positives and negatives included in HPI.    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 Appears well-developed and well-nourished.   Cardiovascular: Normal rate.    Pulmonary/Chest: Effort normal. No respiratory distress.   Neurological: Patient is alert and oriented to person, place, and time.   Skin: Skin is warm and dry.   Psychiatric: Normal mood and affect. Behavior is normal.     Lumbar: No gross deformity.  Tenderness from the left lower lumbar paraspinal musculature through SI joint and lateral hip.  " Range of motion diminished mildly with flexion.  Straight leg test positive on left.  Lower extremity reflexes intact.  Lower extremity strength intact.    Assessment/Plan:     1. Strain of lumbar region, subsequent encounter  - Referral to Physical Therapy  - Referral to Radiology  - etodolac (LODINE) 400 MG tablet; Take 1 Tablet by mouth 2 times a day.  Dispense: 60 Tablet; Refill: 0  - cyclobenzaprine (FLEXERIL) 10 mg Tab; Take 1 Tablet by mouth at bedtime as needed for Moderate Pain or Muscle Spasms.  Dispense: 30 Tablet; Refill: 0  - MR-LUMBAR SPINE-W/O; Future      Given duration of symptoms and continued radicular symptoms refer for MRI lumbar spine without  Referral to chiropractic care, pending approval  Placed referral for physical therapy as well  Prescribed Lodine for 100 mg twice daily and cyclobenzaprine 10 mg to use at night    Work Status: Restricted Duty - see D-39 or other state/federal worker's compensation forms for specific restrictions if applicable  Follow up 4 weeks    Differential diagnosis, natural history, supportive care, and indications for immediate follow-up discussed.

## 2025-01-08 ENCOUNTER — OCCUPATIONAL MEDICINE (OUTPATIENT)
Dept: OCCUPATIONAL MEDICINE | Facility: CLINIC | Age: 50
End: 2025-01-08
Payer: COMMERCIAL

## 2025-01-08 VITALS
SYSTOLIC BLOOD PRESSURE: 120 MMHG | WEIGHT: 200 LBS | HEIGHT: 62 IN | HEART RATE: 115 BPM | DIASTOLIC BLOOD PRESSURE: 78 MMHG | TEMPERATURE: 97.7 F | BODY MASS INDEX: 36.8 KG/M2 | RESPIRATION RATE: 16 BRPM

## 2025-01-08 DIAGNOSIS — S39.012D STRAIN OF LUMBAR REGION, SUBSEQUENT ENCOUNTER: ICD-10-CM

## 2025-01-08 PROCEDURE — 99213 OFFICE O/P EST LOW 20 MIN: CPT | Performed by: PREVENTIVE MEDICINE

## 2025-01-08 ASSESSMENT — PAIN SCALES - GENERAL: PAINLEVEL_OUTOF10: 2=MINIMAL-SLIGHT

## 2025-01-08 NOTE — LETTER
PHYSICIAN’S AND CHIROPRACTIC PHYSICIAN'S   PROGRESS REPORT   CERTIFICATION OF DISABILITY Claim Number:     Social Security Number:    Patient’s Name: Viviana Capps Date of Injury: 11/8/2024   Employer: [employer] Name of MCO (if applicable):      Patient’s Job Description/Occupation: [employer]       Previous Injuries/Diseases/Surgeries Contributing to the Condition:         Diagnosis: (S39.012D) Strain of lumbar region, subsequent encounter      Related to the Industrial Injury? Yes     Explain:        Objective Medical Findings: Lumbar: No gross deformity.  Normal gait  MRI lumbar: Small diffuse disc bulge at L4-5 combining with facet hypertrophy to produce mild bilateral neuroforaminal narrowing.           None - Discharged                         Stable  No                 Ratable  No     X   Generally Improved                         Condition Worsened                  Condition Same  May Have Suffered a Permanent Disability No     Treatment Plan:    Continue chiropractic care  Begin physical therapy as scheduled  May use Lodine and cyclobenzaprine as needed  Will discontinue her work restrictions at this time.  If symptoms continue to improve will release from care at next visit           No Change in Therapy                  PT/OT Prescribed                      Medication May be Used While Working        Case Management                          PT/OT Discontinued    Consultation    Further Diagnostic Studies:    Prescription(s)               X  Released to FULL DUTY /No Restrictions on (Date):  1/8/2025    Certified TOTALLY TEMPORARILY DISABLED (Indicate Dates) From:   To:      Released to RESTRICTED/Modified Duty on (Date): From:   To:    Restrictions Are:         No Sitting    No Standing    No Pulling Other:         No Bending at Waist     No Stooping     No Lifting        No Carrying     No Walking Lifting Restricted to (lbs.):          No Pushing        No  Climbing     No Reaching Above Shoulders       Date of Next Visit:  2/14/2025 at 4:15 PM Date of this Exam: 1/8/2025 Physician/Chiropractic Physician Name: Brice Carvajal D.O. Physician/Chiropractic Physician Signature:  Brice Carvajal DO MPH     [place]:  [facility]  [facility], [address] [place], [place] [address] - Telephone [phone] [place]:  [address] [place] [address], [address] [place], [place] [address] - Telephone [phone]    [email or web address]  D-39 (Rev. 10/24)

## 2025-01-09 NOTE — PROGRESS NOTES
Subjective:     Viviana Capps is a 49 y.o. female who presents for Other (([facility] DOI 11-8-24 LEFT HIP [facility] CABINET/)RM 16)    SARBJIT: Was moving boxes and felt sudden pain in the low back.  Seen in urgent care initially prescribed tizanidine and advised OTC NSAIDs.  Referral to chiropractic care and occupational health was placed     12/11/2024: Patient states that initially symptoms have been improving but the last week symptoms have suddenly worsened.  Has left-sided low back pain with radiation to the left leg.  Only gets occasional numbness and tingling.  Denies any weakness in the lower extremities.  She states that ibuprofen and tizanidine do not seem to be helping much.  She has not heard from the work comp insurance at all with the exception of receiving a medication card.  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    1/8/2025: Patient states that overall she feels significantly improved.  She states she feels over 90% improved.  She has done 5 sessions of chiropractic care.  She is set to begin physical therapy this week and has it scheduled throughout the month of February.  She is very pleased with the progress she has made.  Not really requiring the medications at this time.  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       Objective:     There were no vitals taken for this visit.    Constitutional: Patient is in no acute distress. Appears well-developed and well-nourished.   Cardiovascular: Normal rate.    Pulmonary/Chest: Effort normal. No respiratory distress.   Neurological: Patient is alert and oriented to person, place, and time.   Skin: Skin is warm and dry.   Psychiatric: Normal mood and affect. Behavior is normal.     Lumbar: No gross deformity.  Normal gait  MRI  lumbar: Small diffuse disc bulge at L4-5 combining with facet hypertrophy to produce mild bilateral neuroforaminal narrowing.      Assessment/Plan:     1. Strain of lumbar region, subsequent encounter      Continue chiropractic care  Begin physical therapy as scheduled  May use Lodine and cyclobenzaprine as needed  Will discontinue her work restrictions at this time.  If symptoms continue to improve will release from care at next visit      Work Status: Full Duty - see D-39 or other state/federal worker's compensation forms for specific restrictions if applicable  Follow up 5 weeks    Differential diagnosis, natural history, supportive care, and indications for immediate follow-up discussed.

## 2025-02-14 ENCOUNTER — OCCUPATIONAL MEDICINE (OUTPATIENT)
Dept: OCCUPATIONAL MEDICINE | Facility: CLINIC | Age: 50
End: 2025-02-14
Payer: COMMERCIAL

## 2025-02-14 VITALS
WEIGHT: 200 LBS | DIASTOLIC BLOOD PRESSURE: 80 MMHG | RESPIRATION RATE: 16 BRPM | TEMPERATURE: 97.4 F | BODY MASS INDEX: 36.8 KG/M2 | HEART RATE: 90 BPM | OXYGEN SATURATION: 99 % | SYSTOLIC BLOOD PRESSURE: 124 MMHG | HEIGHT: 62 IN

## 2025-02-14 DIAGNOSIS — S39.012D STRAIN OF LUMBAR REGION, SUBSEQUENT ENCOUNTER: ICD-10-CM

## 2025-02-14 PROCEDURE — 99213 OFFICE O/P EST LOW 20 MIN: CPT | Performed by: PREVENTIVE MEDICINE

## 2025-02-14 NOTE — LETTER
PHYSICIAN’S AND CHIROPRACTIC PHYSICIAN'S   PROGRESS REPORT   CERTIFICATION OF DISABILITY Claim Number:     Social Security Number:    Patient’s Name: Viviana Capps Date of Injury: 11/8/2024   Employer: [employer] Name of MCO (if applicable):      Patient’s Job Description/Occupation: [employer]        Previous Injuries/Diseases/Surgeries Contributing to the Condition:         Diagnosis: (S39.012D) Strain of lumbar region, subsequent encounter      Related to the Industrial Injury? Yes     Explain:        Objective Medical Findings: Lumbar: No gross deformity.  Normal gait  MRI lumbar: Small diffuse disc bulge at L4-5 combining with facet hypertrophy to produce mild bilateral neuroforaminal narrowing.        X   None - Discharged                         Stable  Yes                 Ratable  No     X   Generally Improved                         Condition Worsened               X   Condition Same  May Have Suffered a Permanent Disability No     Treatment Plan:    Symptoms mostly resolved  Will release from care at this point  Expect gradual resolution remaining symptoms  MMI  Follow-up as needed         No Change in Therapy                  PT/OT Prescribed                      Medication May be Used While Working        Case Management                          PT/OT Discontinued    Consultation    Further Diagnostic Studies:    Prescription(s)               X  Released to FULL DUTY /No Restrictions on (Date):  2/14/2025    Certified TOTALLY TEMPORARILY DISABLED (Indicate Dates) From:   To:      Released to RESTRICTED/Modified Duty on (Date): From:   To:    Restrictions Are:         No Sitting    No Standing    No Pulling Other:         No Bending at Waist     No Stooping     No Lifting        No Carrying     No Walking Lifting Restricted to (lbs.):          No Pushing        No Climbing     No Reaching Above Shoulders       Date of Next Visit:   Release from care Date of this  Exam: 2/14/2025 Physician/Chiropractic Physician Name: Brice Carvajal D.O. Physician/Chiropractic Physician Signature:  Brice Carvajal DO MPH     [place]:  [address] [place] [address], [address] [place], [place] [address] - Telephone [phone] [place]:  [address] [place] [address], [address] [place], [place] [address] - Telephone [phone]    [email or web address]  D-39 (Rev. 10/24)

## 2025-02-15 NOTE — PROGRESS NOTES
"Subjective:     Viviana Capps is a 49 y.o. female who presents for Follow-Up ([facility] )    SARBJIT: Was moving boxes and felt sudden pain in the low back.  Seen in urgent care initially prescribed tizanidine and advised OTC NSAIDs.  Referral to chiropractic care and occupational health was placed     12/11/2024: Patient states that initially symptoms have been improving but the last week symptoms have suddenly worsened.  Has left-sided low back pain with radiation to the left leg.  Only gets occasional numbness and tingling.  Denies any weakness in the lower extremities.  She states that ibuprofen and tizanidine do not seem to be helping much.  She has not heard from the work comp insurance at all with the exception of receiving a medication card.  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     1/8/2025: Patient states that overall she feels significantly improved.  She states she feels over 90% improved.  She has done 5 sessions of chiropractic care.  She is set to begin physical therapy this week and has it scheduled throughout the month of February.  She is very pleased with the progress she has made.  Not really requiring the medications at this time.  Works mostly office work and so does not feel that she needs restrictions at this time    2/14/2025: Patient states overall symptoms are mostly improved.  She states she only has occasional pain on the left side as well as low bit of weakness in the left leg.  However she states she was discharged from physical therapy with home exercises and feels comfortable doing this on her own.  Would like to get released at this point.    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       Objective:     /80   Pulse 90   Temp 36.3 °C (97.4 °F) (Temporal)   Resp 16   Ht 1.575 m (5' 2\")   Wt 90.7 kg (200 lb)   SpO2 99%   BMI 36.58 " kg/m²     Constitutional: Patient is in no acute distress. Appears well-developed and well-nourished.   Cardiovascular: Normal rate.    Pulmonary/Chest: Effort normal. No respiratory distress.   Neurological: Patient is alert and oriented to person, place, and time.   Skin: Skin is warm and dry.   Psychiatric: Normal mood and affect. Behavior is normal.     Lumbar: No gross deformity.  Normal gait  MRI lumbar: Small diffuse disc bulge at L4-5 combining with facet hypertrophy to produce mild bilateral neuroforaminal narrowing.      Assessment/Plan:     1. Strain of lumbar region, subsequent encounter      Symptoms mostly resolved  Will release from care at this point  Expect gradual resolution remaining symptoms  MMI  Follow-up as needed    Work Status: Full Duty - see D-39 or other state/federal worker's compensation forms for specific restrictions if applicable  Follow up as needed    Differential diagnosis, natural history, supportive care, and indications for immediate follow-up discussed.